# Patient Record
Sex: FEMALE
[De-identification: names, ages, dates, MRNs, and addresses within clinical notes are randomized per-mention and may not be internally consistent; named-entity substitution may affect disease eponyms.]

---

## 2019-01-14 ENCOUNTER — HOSPITAL ENCOUNTER (EMERGENCY)
Dept: HOSPITAL 88 - FSED | Age: 29
Discharge: HOME | End: 2019-01-14
Payer: COMMERCIAL

## 2019-01-14 VITALS — DIASTOLIC BLOOD PRESSURE: 89 MMHG | SYSTOLIC BLOOD PRESSURE: 140 MMHG

## 2019-01-14 VITALS — WEIGHT: 150 LBS | BODY MASS INDEX: 24.11 KG/M2 | HEIGHT: 66 IN

## 2019-01-14 DIAGNOSIS — R11.2: ICD-10-CM

## 2019-01-14 DIAGNOSIS — R10.31: Primary | ICD-10-CM

## 2019-01-14 PROCEDURE — 99284 EMERGENCY DEPT VISIT MOD MDM: CPT

## 2019-01-14 PROCEDURE — 80053 COMPREHEN METABOLIC PANEL: CPT

## 2019-01-14 PROCEDURE — 74177 CT ABD & PELVIS W/CONTRAST: CPT

## 2019-01-14 PROCEDURE — 81003 URINALYSIS AUTO W/O SCOPE: CPT

## 2019-01-14 PROCEDURE — 85025 COMPLETE CBC W/AUTO DIFF WBC: CPT

## 2019-01-14 PROCEDURE — 81025 URINE PREGNANCY TEST: CPT

## 2019-01-14 NOTE — DIAGNOSTIC IMAGING REPORT
EXAM: CT Abdomen and Pelvis WITH contrast  



INDICATION: Right lower quadrant abdominal pain.



COMPARISON: None.

TECHNIQUE: Abdomen and pelvis were scanned utilizing a multidetector helical

scanner from the lung base to the pubic symphysis after administration of IV

contrast. Coronal and sagittal reformations were obtained. Routine protocol was

performed. Scan was performed when during portal venous phase.    

     IV CONTRAST: 95 cc of Isovue 370

     ORAL CONTRAST: Water

            

COMPLICATIONS: None



RADIATION DOSE:

     Total DLP:  429.6 mGy*cm

     

     CTDIvol has been reviewed. It is below the limits set by the Radiation

Protocol Committee (RPC). Dose modulation, iterative reconstruction, and/or

weight based adjustment of the mA/kV was utilized to reduce the radiation dose

to as low as reasonably achievable. 



FINDINGS:

LINES and TUBES: None.



LOWER THORAX:  Unremarkable



HEPATOBILIARY: No evidence of focal lesion. No biliary ductal dilation. 



GALLBLADDER: No radio-opaque stones or sludge.  No wall thickening.



SPLEEN: No splenomegaly. 



PANCREAS: No focal masses or ductal dilatation.  



ADRENALS: No adrenal nodules 



KIDNEYS/URETERS: Kidneys enhance symmetrically. There is mild right

hydroureteronephrosis with a 4mm right UVJ stone on coronal series 300, image

46. No evidence of solid mass. There is a mild delayed right nephrogram. 



GI TRACT: No evidence of wall thickening or distension. Appendix is normal. 



PELVIC ORGANS/BLADDER: Unremarkable.



LYMPH NODES: No lymphadenopathy.



VESSELS: Unremarkable.



PERITONEUM / RETROPERITONEUM: No free air. Small amount of pelvic free fluid,

likely physiologic. 



BONES AND SOFT TISSUES: Unremarkable.



CONCLUSION:

A 4 mm right UVJ stone with mild right hydroureteronephrosis. 



Normal appendix.



Signed by: Dr. Elenita Vásquez MD on 1/14/2019 7:42 AM

## 2023-03-15 ENCOUNTER — HOSPITAL ENCOUNTER (EMERGENCY)
Dept: HOSPITAL 88 - FSED | Age: 33
Discharge: HOME | End: 2023-03-15
Payer: COMMERCIAL

## 2023-03-15 VITALS — BODY MASS INDEX: 27.04 KG/M2 | HEIGHT: 66 IN | WEIGHT: 168.25 LBS
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
## 2023-03-15 DIAGNOSIS — S01.551A: Primary | ICD-10-CM

## 2023-03-15 PROCEDURE — 99283 EMERGENCY DEPT VISIT LOW MDM: CPT
